# Patient Record
(demographics unavailable — no encounter records)

---

## 2024-10-23 NOTE — PHYSICAL EXAM
[de-identified] : Dressings and drains removed completely. Incisions intact. No significant areas of fullness, fluctuance, erythema, or ecchymosis.

## 2024-10-23 NOTE — REASON FOR VISIT
[Post Op: _________] : a [unfilled] post op visit [FreeTextEntry1] : Dos: 10/15/24; S/P: bilateral subtotal subcutaneous mastectomy without nipple preservation

## 2024-10-23 NOTE — PHYSICAL EXAM
[de-identified] : Dressings and drains removed completely. Incisions intact. No significant areas of fullness, fluctuance, erythema, or ecchymosis.

## 2024-10-23 NOTE — HISTORY OF PRESENT ILLNESS
[FreeTextEntry1] : Patient returns 8 days following double incision top surgery without nipple preservation. Denies any significant issues. Drain output is less than 20cc a day on each side.

## 2024-11-07 NOTE — ASSESSMENT
[FreeTextEntry1] : No evidence of infection or collection. Wound care, activity restrictions, need for compression were reviewed. Follow-up in 3 weeks for reassessment. Follicular Atypia Histology Text: One or more follicles show variable atypia in the sections examined.

## 2024-11-07 NOTE — HISTORY OF PRESENT ILLNESS
[FreeTextEntry1] : The patient returns 3 weeks following double incision top surgery without nipple preservation. Denies any significant issues. Feeling well overall.  Pathology demonstrated benign findings, reviewed with patient.

## 2024-11-07 NOTE — PHYSICAL EXAM
[de-identified] : Good overall symmetry. Incisions intact. No significant areas of fullness, fluctuance, or erythema.

## 2024-11-07 NOTE — PHYSICAL EXAM
[de-identified] : Good overall symmetry. Incisions intact. No significant areas of fullness, fluctuance, or erythema.

## 2024-11-07 NOTE — REASON FOR VISIT
[Post Op: _________] : a [unfilled] post op visit [FreeTextEntry1] : Dos: 10/15/24; S/P: bilateral subtotal subcutaneous mastectomy without nipple preservation.

## 2024-12-05 NOTE — HISTORY OF PRESENT ILLNESS
[FreeTextEntry1] : The patient returns about 6 weeks following double incision top surgery without nipple preservation. Denies any significant issues. States they are happy with the result.

## 2024-12-05 NOTE — PHYSICAL EXAM
[de-identified] : Good overall symmetry. Relative paucity of soft tissue inferior to axilla bilaterally with modest residual prominence of lateral chest roll. Incisions healing well. No significant areas of fullness, fluctuance, or erythema.

## 2024-12-05 NOTE — PHYSICAL EXAM
[de-identified] : Good overall symmetry. Relative paucity of soft tissue inferior to axilla bilaterally with modest residual prominence of lateral chest roll. Incisions healing well. No significant areas of fullness, fluctuance, or erythema.

## 2025-04-17 NOTE — HISTORY OF PRESENT ILLNESS
[FreeTextEntry1] : The patient returns about 6 months following double incision top surgery without nipple preservation. Patient expresses continued dysphoria with "excess skin" and "hollowness" near armpits.

## 2025-04-17 NOTE — PHYSICAL EXAM
[de-identified] : Good overall symmetry. Persistent paucity of soft tissue inferior to axilla bilaterally with modest residual prominence of lateral chest roll. Mild central skin excess. Incisions healed well. No significant areas of fullness.

## 2025-04-17 NOTE — ASSESSMENT
[FreeTextEntry1] : The patient is specifically interested in top surgery revision via direct excision and liposuction. They express understanding of the risks associated with surgery as listed above. To proceed, they will need 1 mental health letter of assessment.  I, Dr. Mccain, personally performed the evaluation and management (E/M) services for this established patient who presents today with continued gender dysphoria. That E/M includes conducting the clinically appropriate interval history &/or exam, assessing all new/exacerbated conditions, and establishing a new plan of care. Today, my YOANDY, Jose Chavez PA-C, was here to observe my evaluation and management service for this continued condition and follow the plan of care established by me going forward.

## 2025-04-17 NOTE — REASON FOR VISIT
[Follow-Up: _____] : a [unfilled] follow-up visit [FreeTextEntry1] : Dos: 10/15/24; S/P: bilateral subtotal subcutaneous mastectomy without nipple preservation.

## 2025-04-17 NOTE — PHYSICAL EXAM
[de-identified] : Good overall symmetry. Persistent paucity of soft tissue inferior to axilla bilaterally with modest residual prominence of lateral chest roll. Mild central skin excess. Incisions healed well. No significant areas of fullness.

## 2025-05-12 NOTE — ASSESSMENT
[FreeTextEntry1] : No evidence of infection or collection. No further restrictions or compression needed. Scar care was reviewed. Follow-up at 6 months for reassessment or as needed.
[FreeTextEntry1] : No evidence of infection or collection. No further restrictions or compression needed. Scar care was reviewed. Follow-up at 6 months for reassessment or as needed.
[facility]